# Patient Record
Sex: MALE | Race: OTHER | NOT HISPANIC OR LATINO | URBAN - METROPOLITAN AREA
[De-identification: names, ages, dates, MRNs, and addresses within clinical notes are randomized per-mention and may not be internally consistent; named-entity substitution may affect disease eponyms.]

---
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
## 2023-01-10 NOTE — ED PEDIATRIC NURSE NOTE - NS_BILL_OF_RIGHTS_ED_P_ED_DT
Dr Ramirez recommends you go to ER for treatment of the atrial fibrillation and heart failure symptoms. Considering transesophageal echocardiogram with cardioversion or amiodarone to convert out of atrial fibrillation.     I will call ER and let them know you are coming.        10-Apr-2020 17:37